# Patient Record
Sex: FEMALE | Race: ASIAN
[De-identification: names, ages, dates, MRNs, and addresses within clinical notes are randomized per-mention and may not be internally consistent; named-entity substitution may affect disease eponyms.]

---

## 2022-10-21 ENCOUNTER — APPOINTMENT (OUTPATIENT)
Dept: ORTHOPEDIC SURGERY | Facility: CLINIC | Age: 42
End: 2022-10-21
Payer: COMMERCIAL

## 2022-10-21 ENCOUNTER — TRANSCRIPTION ENCOUNTER (OUTPATIENT)
Age: 42
End: 2022-10-21

## 2022-10-21 VITALS — BODY MASS INDEX: 27.83 KG/M2 | WEIGHT: 163 LBS | RESPIRATION RATE: 16 BRPM | HEIGHT: 64 IN

## 2022-10-21 DIAGNOSIS — Z83.3 FAMILY HISTORY OF DIABETES MELLITUS: ICD-10-CM

## 2022-10-21 DIAGNOSIS — Z78.9 OTHER SPECIFIED HEALTH STATUS: ICD-10-CM

## 2022-10-21 DIAGNOSIS — Z82.49 FAMILY HISTORY OF ISCHEMIC HEART DISEASE AND OTHER DISEASES OF THE CIRCULATORY SYSTEM: ICD-10-CM

## 2022-10-21 DIAGNOSIS — Z72.3 LACK OF PHYSICAL EXERCISE: ICD-10-CM

## 2022-10-21 DIAGNOSIS — M75.01 ADHESIVE CAPSULITIS OF RIGHT SHOULDER: ICD-10-CM

## 2022-10-21 PROBLEM — Z00.00 ENCOUNTER FOR PREVENTIVE HEALTH EXAMINATION: Status: ACTIVE | Noted: 2022-10-21

## 2022-10-21 PROCEDURE — 73030 X-RAY EXAM OF SHOULDER: CPT | Mod: RT

## 2022-10-21 PROCEDURE — 99204 OFFICE O/P NEW MOD 45 MIN: CPT | Mod: 25

## 2022-10-21 PROCEDURE — 20611 DRAIN/INJ JOINT/BURSA W/US: CPT | Mod: RT

## 2022-10-21 RX ORDER — CELECOXIB 200 MG/1
200 CAPSULE ORAL DAILY
Qty: 90 | Refills: 1 | Status: ACTIVE | COMMUNITY
Start: 2022-10-21 | End: 1900-01-01

## 2023-02-03 ENCOUNTER — APPOINTMENT (OUTPATIENT)
Dept: OBGYN | Facility: CLINIC | Age: 43
End: 2023-02-03
Payer: COMMERCIAL

## 2023-02-03 ENCOUNTER — LABORATORY RESULT (OUTPATIENT)
Age: 43
End: 2023-02-03

## 2023-02-03 VITALS
OXYGEN SATURATION: 96 % | WEIGHT: 166 LBS | BODY MASS INDEX: 28.34 KG/M2 | HEIGHT: 64 IN | DIASTOLIC BLOOD PRESSURE: 83 MMHG | HEART RATE: 84 BPM | SYSTOLIC BLOOD PRESSURE: 122 MMHG

## 2023-02-03 DIAGNOSIS — Z01.419 ENCOUNTER FOR GYNECOLOGICAL EXAMINATION (GENERAL) (ROUTINE) W/OUT ABNORMAL FINDINGS: ICD-10-CM

## 2023-02-03 DIAGNOSIS — Z12.39 ENCOUNTER FOR OTHER SCREENING FOR MALIGNANT NEOPLASM OF BREAST: ICD-10-CM

## 2023-02-03 DIAGNOSIS — N92.6 IRREGULAR MENSTRUATION, UNSPECIFIED: ICD-10-CM

## 2023-02-03 DIAGNOSIS — R63.5 ABNORMAL WEIGHT GAIN: ICD-10-CM

## 2023-02-03 DIAGNOSIS — T14.8XXA OTHER INJURY OF UNSPECIFIED BODY REGION, INITIAL ENCOUNTER: ICD-10-CM

## 2023-02-03 DIAGNOSIS — Z11.3 ENCOUNTER FOR SCREENING FOR INFECTIONS WITH A PREDOMINANTLY SEXUAL MODE OF TRANSMISSION: ICD-10-CM

## 2023-02-03 DIAGNOSIS — R21 RASH AND OTHER NONSPECIFIC SKIN ERUPTION: ICD-10-CM

## 2023-02-03 PROCEDURE — 99203 OFFICE O/P NEW LOW 30 MIN: CPT | Mod: 25

## 2023-02-03 PROCEDURE — 99386 PREV VISIT NEW AGE 40-64: CPT

## 2023-02-03 PROCEDURE — 36415 COLL VENOUS BLD VENIPUNCTURE: CPT

## 2023-02-06 LAB
ALBUMIN SERPL ELPH-MCNC: 4.3 G/DL
ALP BLD-CCNC: 73 U/L
ALT SERPL-CCNC: 22 U/L
ANION GAP SERPL CALC-SCNC: 11 MMOL/L
AST SERPL-CCNC: 20 U/L
BILIRUB SERPL-MCNC: 0.3 MG/DL
BUN SERPL-MCNC: 9 MG/DL
CALCIUM SERPL-MCNC: 10 MG/DL
CHLORIDE SERPL-SCNC: 102 MMOL/L
CO2 SERPL-SCNC: 25 MMOL/L
CREAT SERPL-MCNC: 0.62 MG/DL
EGFR: 113 ML/MIN/1.73M2
GLUCOSE SERPL-MCNC: 88 MG/DL
HBV SURFACE AG SER QL: NONREACTIVE
HCV AB SER QL: NONREACTIVE
HCV S/CO RATIO: 0.05 S/CO
HIV1+2 AB SPEC QL IA.RAPID: NONREACTIVE
POTASSIUM SERPL-SCNC: 4.4 MMOL/L
PROGEST SERPL-MCNC: 1.6 NG/ML
PROLACTIN SERPL-MCNC: 10.5 NG/ML
PROT SERPL-MCNC: 7.2 G/DL
SODIUM SERPL-SCNC: 137 MMOL/L
T PALLIDUM AB SER QL IA: NEGATIVE
TESTOST SERPL-MCNC: 17.9 NG/DL
TSH SERPL-ACNC: 1.95 UIU/ML

## 2023-02-06 NOTE — PLAN
[FreeTextEntry1] : \par \par \par \par 43 y/o G0 here to establish GYN care, with some complaints; due for annual exam, interested in possible future fertility \par \par # WWE\par - Pap smear and HPV done\par - GC/CT cxs via pap done\par - STD blood work panel done\par - Breast exam done and WNL\par - Referral for [facility]G given \par - Age-related screening tests and timing discussed\par \par # Irregular menstrual cycles \par - Started Nov 2022, started getting 2x/month \par - PCOS panel blood work - Blood work done to seek out etiologies\par - Referral for GYN u/s at [facility] given\par - Not currently on hormonal contraception, does not desire \par \par # Weight gain\par - Pt concerned, hasn't had blood work with PCP recently\par - Wants blood work to see if there is a medical reason for the weight gain or if age-related or both \par - Blood work drawn, including TSH and screening for diabetes and lipid panel\par - Pt made appt to see nutritionist and is trying to address with dietary and lifestyle modifications\par \par # Previous bite on neck that had rash that is now resolved\par - Pt has appt to see dermatologist soon\par - Wanted blood work to screen for Lyme disease as she doesn't know what actually bit her, but there is still scarring from the bite that is visible on her neck \par - Thyroid exam done and thyroid is not enlarged \par - Blood work for Borrelia burgdorferi antibodies drawn \par \par # Fertility \par - Pt has never TTC prior; she is single currently but has expressed interest in checking fertility status\par - Discussed if she is really considering moving forward that she should do an MEHREEN consult given her age\par - She preferred to check AMH level soon; discussed that if she does have PCOS that this value could be falsely elevated \par \par In addition to the preventative portion of the visit, I spent an additional 30 minutes on the date of the encounter in evaluating and treating the pt; questions answered, education provided, follow-up discussed.

## 2023-02-06 NOTE — HISTORY OF PRESENT ILLNESS
[Patient reported PAP Smear was normal] : Patient reported PAP Smear was normal [Y] : Patient reports abnormal menses [N] : Patient denies prior pregnancies [Regular Cycle Intervals] : periods have been regular [Frequency: Q ___ days] : menstrual periods occur approximately every [unfilled] days [Menarche Age: ____] : age at menarche was [unfilled] [No] : Patient does not have concerns regarding sex [Previously active] : previously active [TextBox_19] : never had mammogram  [PapSmeardate] : 6/2019 [LMPDate] : 01/7/23 [MensesFreq] : 39 [MensesLength] : 4-5 [FreeTextEntry1] : 01/07/23

## 2023-02-06 NOTE — COUNSELING
[Nutrition/ Exercise/ Weight Management] : nutrition, exercise, weight management [Vitamins/Supplements] : vitamins/supplements [Breast Self Exam] : breast self exam [Contraception/ Emergency Contraception/ Safe Sexual Practices] : contraception, emergency contraception, safe sexual practices [Confidentiality] : confidentiality [STD (testing, results, tx)] : STD (testing, results, tx) [Vaccines] : vaccines

## 2023-02-24 LAB
17OHP SERPL-MCNC: 106 NG/DL
25(OH)D3 SERPL-MCNC: 16.3 NG/ML
ANDROST SERPL-MCNC: 150 NG/DL
ANTI-MUELLERIAN HORMONE: 2.5 NG/ML
BASOPHILS # BLD AUTO: 0.03 K/UL
BASOPHILS NFR BLD AUTO: 0.4 %
C TRACH RRNA SPEC QL NAA+PROBE: NOT DETECTED
CHOLEST SERPL-MCNC: 227 MG/DL
CYTOLOGY CVX/VAG DOC THIN PREP: NORMAL
DHEA-S SERPL-MCNC: 137 UG/DL
DHEA-SULFATE, SERUM: 94 UG/DL
EOSINOPHIL # BLD AUTO: 0.18 K/UL
EOSINOPHIL NFR BLD AUTO: 2.4 %
ESTIMATED AVERAGE GLUCOSE: 114 MG/DL
ESTROGEN SERPL-MCNC: 203 PG/ML
FSH SERPL-MCNC: 4.8 IU/L
HBA1C MFR BLD HPLC: 5.6 %
HCT VFR BLD CALC: 39.6 %
HDLC SERPL-MCNC: 31 MG/DL
HGB BLD-MCNC: 11.8 G/DL
HPV HIGH+LOW RISK DNA PNL CVX: NOT DETECTED
IGF-1 INTERP: NORMAL
IGF-I BLD-MCNC: 101 NG/ML
IMM GRANULOCYTES NFR BLD AUTO: 0.3 %
IRON SATN MFR SERPL: 27 %
IRON SERPL-MCNC: 85 UG/DL
LDLC SERPL CALC-MCNC: 146 MG/DL
LH SERPL-ACNC: 10.7 IU/L
LYMPHOCYTES # BLD AUTO: 2.57 K/UL
LYMPHOCYTES NFR BLD AUTO: 34.3 %
MAN DIFF?: NORMAL
MCHC RBC-ENTMCNC: 17.9 PG
MCHC RBC-ENTMCNC: 29.8 GM/DL
MCV RBC AUTO: 60.1 FL
MONOCYTES # BLD AUTO: 0.52 K/UL
MONOCYTES NFR BLD AUTO: 6.9 %
N GONORRHOEA RRNA SPEC QL NAA+PROBE: NOT DETECTED
NEUTROPHILS # BLD AUTO: 4.17 K/UL
NEUTROPHILS NFR BLD AUTO: 55.7 %
NONHDLC SERPL-MCNC: 197 MG/DL
PLATELET # BLD AUTO: 359 K/UL
RBC # BLD: 6.59 M/UL
RBC # FLD: 18.9 %
SOURCE TP AMPLIFICATION: NORMAL
TIBC SERPL-MCNC: 316 UG/DL
TRIGL SERPL-MCNC: 255 MG/DL
UIBC SERPL-MCNC: 230 UG/DL
VIT B12 SERPL-MCNC: 599 PG/ML
WBC # FLD AUTO: 7.49 K/UL

## 2023-07-12 ENCOUNTER — APPOINTMENT (OUTPATIENT)
Dept: ORTHOPEDIC SURGERY | Facility: CLINIC | Age: 43
End: 2023-07-12
Payer: COMMERCIAL

## 2023-07-12 DIAGNOSIS — M75.02 ADHESIVE CAPSULITIS OF LEFT SHOULDER: ICD-10-CM

## 2023-07-12 PROCEDURE — 73030 X-RAY EXAM OF SHOULDER: CPT | Mod: LT

## 2023-07-12 PROCEDURE — 99214 OFFICE O/P EST MOD 30 MIN: CPT | Mod: 25

## 2023-07-12 PROCEDURE — 20611 DRAIN/INJ JOINT/BURSA W/US: CPT | Mod: LT

## 2023-07-12 NOTE — HISTORY OF PRESENT ILLNESS
[de-identified] : LOCATION:LEFT SHOULDER \par DURATION:2 MONTHS AGO  - NO SPECIFIC INJURY \par QUALITY:DULL \par RADIATING PAIN DOWN ARM  \par INTERMITTENT \par PAIN LEVEL: 6-7/10 \par BETTER WITH RESTING, ADVIL, TYLENOL \par WORSE WITH OVER HEAD LIFTING, REACHING, AT NIGHT \par \par RIGHT SHOULDER- WAS DIAGNOSED WITH FROZEN SHOULDER HAD INJ AND P.T\par \par

## 2023-07-12 NOTE — DISCUSSION/SUMMARY
[de-identified] : \par PATIENT HAS ELECTED TO PROCEED WITH KENALOG INJECTION SHOULDER \par RISKS AND BENEFITS DISCUSSED - VERBAL CONSENT OBTAINED \par SEE PROCEDURE NOTE\par \par \par POST INJECTION INSTRUCTIONS:\par \par INJECTION THERAPY HANDOUT PROVIDED\par \par COLD THERAPY , ANALGESICS PRN\par \par HOME  EXERCISES QD - FROZEN SHOULDER \par \par CONSIDER  P.T.  WITHIN 2 WEEKS AFTER INJECTION - 2 X 4 WEEKS \par \par

## 2023-07-12 NOTE — PROCEDURE
[de-identified] : INJECTION LEFT SHOULDER GH JOINT AND SA SPACE \par \par Patient has demonstrated limited relief from NSAIDS, rest, exercises / PT, and after discussion of the risks and benefits, the patient has elected to proceed with an ULTRASOUND GUIDED injection into the RIGHT GLENOHUMERAL JOINT AND SA SPACE \par  \par Confirmed that the patient does not have history of prior adverse reactions, active, infections, or relevant allergies. There was no effusion, erythema, or warmth, and the skin was clear\par \par The skin was sterilized with alcohol. Ethyl Chloride was used as a topical anesthetic. Routine sterile technique. \par The site was injected UTILIZING ULTRASOUND GUIDANCE to confirm appropriate placement of the needle-\par with a mixture of medication and local anesthetic. The injection was completed without complication and a bandage was applied.\par  \par The patient tolerated the procedure well and was given post-injection instructions.Rec: Cold therapy, analgesics, avoid heavy activity.\par MEDICATION: 4cc of 1% xylocaine + 20mg of KENALOG EACH SITE \par \par

## 2023-07-12 NOTE — PHYSICAL EXAM
[de-identified] : PHYSICAL EXAM LEFT  SHOULDER\par \par NECK EXAM \par FROM\par NONTENDER \par SPURLING  RIGHT=NEG, LEFT=NEG\par \par NORMAL POSTURE / SCAPULAR PROTRACTION\par AROM \par LEFT 120 / 120 / 70 / 0 \par RIGHT 140 / 140 - 75 / 15\par TENDER: SA REGION / AC JOINT / GH JOINT / BICEPS GROOVE\par \par SPECIAL TESTING :\par BELCHER - POSITIVE \par NEFTALY - POSITIVE \par SPEED TEST - POSITIVE\par \par POP - NEGATIVE \par APPREHENSION AND SUPPRESSION - NEGATIVE \par \par RC STRENGTH TESTING \par SS:  5/5\par SUB 5/5\par IS     5/5\par BICEPS  5/5\par \par SENSATION  - GROSSLY INTACT\par \par \par   [de-identified] : LEFT SHOULDER XRAY (2 VIEWS - AP AND OUTLET) -  \par NO OBVIOUS FRACTURE , SEPARATION OR DISLOCATION \par NO SIGNIFICANT OSTEOARTHRITIS, \par TYPE 1B ACROMION \par CSA=33.0\par